# Patient Record
Sex: FEMALE | Race: WHITE | HISPANIC OR LATINO | Employment: UNEMPLOYED | URBAN - METROPOLITAN AREA
[De-identification: names, ages, dates, MRNs, and addresses within clinical notes are randomized per-mention and may not be internally consistent; named-entity substitution may affect disease eponyms.]

---

## 2021-11-08 ENCOUNTER — HOSPITAL ENCOUNTER (INPATIENT)
Facility: MEDICAL CENTER | Age: 51
LOS: 2 days | DRG: 812 | End: 2021-11-10
Attending: EMERGENCY MEDICINE | Admitting: STUDENT IN AN ORGANIZED HEALTH CARE EDUCATION/TRAINING PROGRAM
Payer: MEDICAID

## 2021-11-08 ENCOUNTER — APPOINTMENT (OUTPATIENT)
Dept: RADIOLOGY | Facility: MEDICAL CENTER | Age: 51
DRG: 812 | End: 2021-11-08
Attending: EMERGENCY MEDICINE
Payer: MEDICAID

## 2021-11-08 DIAGNOSIS — D64.9 SYMPTOMATIC ANEMIA: ICD-10-CM

## 2021-11-08 DIAGNOSIS — K92.2 UGI BLEED: ICD-10-CM

## 2021-11-08 DIAGNOSIS — D64.9 ANEMIA, UNSPECIFIED TYPE: ICD-10-CM

## 2021-11-08 DIAGNOSIS — R63.4 WEIGHT LOSS: ICD-10-CM

## 2021-11-08 DIAGNOSIS — E11.9 TYPE 2 DIABETES MELLITUS WITHOUT COMPLICATION, WITHOUT LONG-TERM CURRENT USE OF INSULIN (HCC): ICD-10-CM

## 2021-11-08 DIAGNOSIS — R10.31 RIGHT LOWER QUADRANT ABDOMINAL PAIN: ICD-10-CM

## 2021-11-08 PROBLEM — R73.9 HYPERGLYCEMIA: Status: ACTIVE | Noted: 2021-11-08

## 2021-11-08 LAB
ABO + RH BLD: NORMAL
ABO GROUP BLD: NORMAL
ALBUMIN SERPL BCP-MCNC: 4 G/DL (ref 3.2–4.9)
ALBUMIN/GLOB SERPL: 1.3 G/DL
ALP SERPL-CCNC: 82 U/L (ref 30–99)
ALT SERPL-CCNC: 17 U/L (ref 2–50)
ANION GAP SERPL CALC-SCNC: 10 MMOL/L (ref 7–16)
ANISOCYTOSIS BLD QL SMEAR: ABNORMAL
APPEARANCE UR: CLEAR
AST SERPL-CCNC: 14 U/L (ref 12–45)
BACTERIA #/AREA URNS HPF: ABNORMAL /HPF
BARCODED ABORH UBTYP: 600
BARCODED ABORH UBTYP: 6200
BARCODED PRD CODE UBPRD: NORMAL
BARCODED PRD CODE UBPRD: NORMAL
BARCODED UNIT NUM UBUNT: NORMAL
BARCODED UNIT NUM UBUNT: NORMAL
BASOPHILS # BLD AUTO: 0.6 % (ref 0–1.8)
BASOPHILS # BLD: 0.08 K/UL (ref 0–0.12)
BILIRUB SERPL-MCNC: 0.3 MG/DL (ref 0.1–1.5)
BILIRUB UR QL STRIP.AUTO: NEGATIVE
BLD GP AB SCN SERPL QL: NORMAL
BUN SERPL-MCNC: 18 MG/DL (ref 8–22)
CALCIUM SERPL-MCNC: 8.9 MG/DL (ref 8.5–10.5)
CHLORIDE SERPL-SCNC: 101 MMOL/L (ref 96–112)
CO2 SERPL-SCNC: 25 MMOL/L (ref 20–33)
COLOR UR: YELLOW
COMMENT 1642: NORMAL
COMPONENT R 8504R: NORMAL
COMPONENT R 8504R: NORMAL
CREAT SERPL-MCNC: 0.6 MG/DL (ref 0.5–1.4)
EOSINOPHIL # BLD AUTO: 0.05 K/UL (ref 0–0.51)
EOSINOPHIL NFR BLD: 0.4 % (ref 0–6.9)
EPI CELLS #/AREA URNS HPF: ABNORMAL /HPF
ERYTHROCYTE [DISTWIDTH] IN BLOOD BY AUTOMATED COUNT: 45.4 FL (ref 35.9–50)
GLOBULIN SER CALC-MCNC: 3.2 G/DL (ref 1.9–3.5)
GLUCOSE SERPL-MCNC: 350 MG/DL (ref 65–99)
GLUCOSE UR STRIP.AUTO-MCNC: >=1000 MG/DL
HCG SERPL QL: NEGATIVE
HCT VFR BLD AUTO: 23.5 % (ref 37–47)
HGB BLD-MCNC: 5.9 G/DL (ref 12–16)
HYPOCHROMIA BLD QL SMEAR: ABNORMAL
IMM GRANULOCYTES # BLD AUTO: 0.04 K/UL (ref 0–0.11)
IMM GRANULOCYTES NFR BLD AUTO: 0.3 % (ref 0–0.9)
INR PPP: 1.03 (ref 0.87–1.13)
KETONES UR STRIP.AUTO-MCNC: NEGATIVE MG/DL
LEUKOCYTE ESTERASE UR QL STRIP.AUTO: ABNORMAL
LIPASE SERPL-CCNC: 37 U/L (ref 11–82)
LYMPHOCYTES # BLD AUTO: 1.34 K/UL (ref 1–4.8)
LYMPHOCYTES NFR BLD: 10.2 % (ref 22–41)
MCH RBC QN AUTO: 15.2 PG (ref 27–33)
MCHC RBC AUTO-ENTMCNC: 25.1 G/DL (ref 33.6–35)
MCV RBC AUTO: 60.4 FL (ref 81.4–97.8)
MICRO URNS: ABNORMAL
MICROCYTES BLD QL SMEAR: ABNORMAL
MONOCYTES # BLD AUTO: 0.62 K/UL (ref 0–0.85)
MONOCYTES NFR BLD AUTO: 4.7 % (ref 0–13.4)
MORPHOLOGY BLD-IMP: NORMAL
NEUTROPHILS # BLD AUTO: 11.06 K/UL (ref 2–7.15)
NEUTROPHILS NFR BLD: 83.8 % (ref 44–72)
NITRITE UR QL STRIP.AUTO: NEGATIVE
NRBC # BLD AUTO: 0 K/UL
NRBC BLD-RTO: 0 /100 WBC
PH UR STRIP.AUTO: 6.5 [PH] (ref 5–8)
PLATELET # BLD AUTO: 474 K/UL (ref 164–446)
PLATELET BLD QL SMEAR: NORMAL
PMV BLD AUTO: 10.5 FL (ref 9–12.9)
POIKILOCYTOSIS BLD QL SMEAR: NORMAL
POTASSIUM SERPL-SCNC: 4.4 MMOL/L (ref 3.6–5.5)
PRODUCT TYPE UPROD: NORMAL
PRODUCT TYPE UPROD: NORMAL
PROT SERPL-MCNC: 7.2 G/DL (ref 6–8.2)
PROT UR QL STRIP: NEGATIVE MG/DL
PROTHROMBIN TIME: 13.2 SEC (ref 12–14.6)
RBC # BLD AUTO: 3.89 M/UL (ref 4.2–5.4)
RBC # URNS HPF: ABNORMAL /HPF
RBC BLD AUTO: PRESENT
RBC UR QL AUTO: ABNORMAL
RH BLD: NORMAL
SODIUM SERPL-SCNC: 136 MMOL/L (ref 135–145)
SP GR UR STRIP.AUTO: 1.02
STOMATOCYTES BLD QL SMEAR: NORMAL
UNIT STATUS USTAT: NORMAL
UNIT STATUS USTAT: NORMAL
UROBILINOGEN UR STRIP.AUTO-MCNC: 1 MG/DL
WBC # BLD AUTO: 13.2 K/UL (ref 4.8–10.8)
WBC #/AREA URNS HPF: ABNORMAL /HPF

## 2021-11-08 PROCEDURE — 770006 HCHG ROOM/CARE - MED/SURG/GYN SEMI*

## 2021-11-08 PROCEDURE — 83550 IRON BINDING TEST: CPT

## 2021-11-08 PROCEDURE — A9270 NON-COVERED ITEM OR SERVICE: HCPCS | Performed by: EMERGENCY MEDICINE

## 2021-11-08 PROCEDURE — 96375 TX/PRO/DX INJ NEW DRUG ADDON: CPT

## 2021-11-08 PROCEDURE — 84703 CHORIONIC GONADOTROPIN ASSAY: CPT

## 2021-11-08 PROCEDURE — 80053 COMPREHEN METABOLIC PANEL: CPT

## 2021-11-08 PROCEDURE — 36430 TRANSFUSION BLD/BLD COMPNT: CPT

## 2021-11-08 PROCEDURE — 86923 COMPATIBILITY TEST ELECTRIC: CPT

## 2021-11-08 PROCEDURE — 700117 HCHG RX CONTRAST REV CODE 255: Performed by: EMERGENCY MEDICINE

## 2021-11-08 PROCEDURE — 99285 EMERGENCY DEPT VISIT HI MDM: CPT

## 2021-11-08 PROCEDURE — 700102 HCHG RX REV CODE 250 W/ 637 OVERRIDE(OP): Performed by: EMERGENCY MEDICINE

## 2021-11-08 PROCEDURE — 700111 HCHG RX REV CODE 636 W/ 250 OVERRIDE (IP): Performed by: EMERGENCY MEDICINE

## 2021-11-08 PROCEDURE — 83690 ASSAY OF LIPASE: CPT

## 2021-11-08 PROCEDURE — 85610 PROTHROMBIN TIME: CPT

## 2021-11-08 PROCEDURE — 86850 RBC ANTIBODY SCREEN: CPT

## 2021-11-08 PROCEDURE — P9016 RBC LEUKOCYTES REDUCED: HCPCS

## 2021-11-08 PROCEDURE — 83540 ASSAY OF IRON: CPT

## 2021-11-08 PROCEDURE — 86900 BLOOD TYPING SEROLOGIC ABO: CPT

## 2021-11-08 PROCEDURE — 85025 COMPLETE CBC W/AUTO DIFF WBC: CPT

## 2021-11-08 PROCEDURE — 81001 URINALYSIS AUTO W/SCOPE: CPT

## 2021-11-08 PROCEDURE — 82728 ASSAY OF FERRITIN: CPT

## 2021-11-08 PROCEDURE — 86901 BLOOD TYPING SEROLOGIC RH(D): CPT

## 2021-11-08 PROCEDURE — 30233N1 TRANSFUSION OF NONAUTOLOGOUS RED BLOOD CELLS INTO PERIPHERAL VEIN, PERCUTANEOUS APPROACH: ICD-10-PCS | Performed by: EMERGENCY MEDICINE

## 2021-11-08 PROCEDURE — 96374 THER/PROPH/DIAG INJ IV PUSH: CPT

## 2021-11-08 PROCEDURE — 74177 CT ABD & PELVIS W/CONTRAST: CPT

## 2021-11-08 RX ORDER — ONDANSETRON 2 MG/ML
4 INJECTION INTRAMUSCULAR; INTRAVENOUS ONCE
Status: COMPLETED | OUTPATIENT
Start: 2021-11-08 | End: 2021-11-08

## 2021-11-08 RX ORDER — IBUPROFEN 200 MG
950 CAPSULE ORAL DAILY
COMMUNITY

## 2021-11-08 RX ADMIN — ONDANSETRON 4 MG: 2 INJECTION INTRAMUSCULAR; INTRAVENOUS at 22:35

## 2021-11-08 RX ADMIN — IOHEXOL 80 ML: 350 INJECTION, SOLUTION INTRAVENOUS at 23:15

## 2021-11-08 RX ADMIN — FAMOTIDINE 20 MG: 10 INJECTION INTRAVENOUS at 22:36

## 2021-11-08 RX ADMIN — LIDOCAINE HYDROCHLORIDE 30 ML: 20 SOLUTION OROPHARYNGEAL at 22:36

## 2021-11-09 PROBLEM — E11.9 TYPE 2 DIABETES MELLITUS, WITHOUT LONG-TERM CURRENT USE OF INSULIN (HCC): Status: ACTIVE | Noted: 2021-11-09

## 2021-11-09 LAB
BASOPHILS # BLD AUTO: 0.7 % (ref 0–1.8)
BASOPHILS # BLD: 0.07 K/UL (ref 0–0.12)
EOSINOPHIL # BLD AUTO: 0.21 K/UL (ref 0–0.51)
EOSINOPHIL NFR BLD: 2 % (ref 0–6.9)
ERYTHROCYTE [DISTWIDTH] IN BLOOD BY AUTOMATED COUNT: 51.1 FL (ref 35.9–50)
EST. AVERAGE GLUCOSE BLD GHB EST-MCNC: 154 MG/DL
FERRITIN SERPL-MCNC: 2.1 NG/ML (ref 10–291)
GLUCOSE BLD-MCNC: 145 MG/DL (ref 65–99)
GLUCOSE BLD-MCNC: 208 MG/DL (ref 65–99)
GLUCOSE BLD-MCNC: 81 MG/DL (ref 65–99)
GLUCOSE BLD-MCNC: 96 MG/DL (ref 65–99)
HBA1C MFR BLD: 7 % (ref 4–5.6)
HCT VFR BLD AUTO: 25.8 % (ref 37–47)
HGB BLD-MCNC: 6.8 G/DL (ref 12–16)
HGB BLD-MCNC: 8.4 G/DL (ref 12–16)
HGB BLD-MCNC: 8.4 G/DL (ref 12–16)
IMM GRANULOCYTES # BLD AUTO: 0.05 K/UL (ref 0–0.11)
IMM GRANULOCYTES NFR BLD AUTO: 0.5 % (ref 0–0.9)
IRON SATN MFR SERPL: 2 % (ref 15–55)
IRON SERPL-MCNC: 11 UG/DL (ref 40–170)
LYMPHOCYTES # BLD AUTO: 2.57 K/UL (ref 1–4.8)
LYMPHOCYTES NFR BLD: 23.9 % (ref 22–41)
MCH RBC QN AUTO: 16.7 PG (ref 27–33)
MCHC RBC AUTO-ENTMCNC: 26.4 G/DL (ref 33.6–35)
MCV RBC AUTO: 63.5 FL (ref 81.4–97.8)
MONOCYTES # BLD AUTO: 0.87 K/UL (ref 0–0.85)
MONOCYTES NFR BLD AUTO: 8.1 % (ref 0–13.4)
MORPHOLOGY BLD-IMP: NORMAL
NEUTROPHILS # BLD AUTO: 6.99 K/UL (ref 2–7.15)
NEUTROPHILS NFR BLD: 64.8 % (ref 44–72)
NRBC # BLD AUTO: 0 K/UL
NRBC BLD-RTO: 0 /100 WBC
PLATELET # BLD AUTO: 372 K/UL (ref 164–446)
PMV BLD AUTO: 10.8 FL (ref 9–12.9)
RBC # BLD AUTO: 4.06 M/UL (ref 4.2–5.4)
TIBC SERPL-MCNC: 455 UG/DL (ref 250–450)
UIBC SERPL-MCNC: 444 UG/DL (ref 110–370)
WBC # BLD AUTO: 10.8 K/UL (ref 4.8–10.8)

## 2021-11-09 PROCEDURE — 99223 1ST HOSP IP/OBS HIGH 75: CPT | Performed by: STUDENT IN AN ORGANIZED HEALTH CARE EDUCATION/TRAINING PROGRAM

## 2021-11-09 PROCEDURE — 700102 HCHG RX REV CODE 250 W/ 637 OVERRIDE(OP): Performed by: STUDENT IN AN ORGANIZED HEALTH CARE EDUCATION/TRAINING PROGRAM

## 2021-11-09 PROCEDURE — P9016 RBC LEUKOCYTES REDUCED: HCPCS

## 2021-11-09 PROCEDURE — 700105 HCHG RX REV CODE 258: Performed by: NURSE PRACTITIONER

## 2021-11-09 PROCEDURE — 86923 COMPATIBILITY TEST ELECTRIC: CPT

## 2021-11-09 PROCEDURE — 700102 HCHG RX REV CODE 250 W/ 637 OVERRIDE(OP): Performed by: NURSE PRACTITIONER

## 2021-11-09 PROCEDURE — 96372 THER/PROPH/DIAG INJ SC/IM: CPT

## 2021-11-09 PROCEDURE — A9270 NON-COVERED ITEM OR SERVICE: HCPCS | Performed by: STUDENT IN AN ORGANIZED HEALTH CARE EDUCATION/TRAINING PROGRAM

## 2021-11-09 PROCEDURE — 36430 TRANSFUSION BLD/BLD COMPNT: CPT

## 2021-11-09 PROCEDURE — A9270 NON-COVERED ITEM OR SERVICE: HCPCS | Performed by: NURSE PRACTITIONER

## 2021-11-09 PROCEDURE — 85018 HEMOGLOBIN: CPT | Mod: 91

## 2021-11-09 PROCEDURE — 83036 HEMOGLOBIN GLYCOSYLATED A1C: CPT

## 2021-11-09 PROCEDURE — 85025 COMPLETE CBC W/AUTO DIFF WBC: CPT

## 2021-11-09 PROCEDURE — 700111 HCHG RX REV CODE 636 W/ 250 OVERRIDE (IP): Performed by: NURSE PRACTITIONER

## 2021-11-09 PROCEDURE — 700105 HCHG RX REV CODE 258: Performed by: STUDENT IN AN ORGANIZED HEALTH CARE EDUCATION/TRAINING PROGRAM

## 2021-11-09 PROCEDURE — 82962 GLUCOSE BLOOD TEST: CPT | Mod: 91

## 2021-11-09 PROCEDURE — 770006 HCHG ROOM/CARE - MED/SURG/GYN SEMI*

## 2021-11-09 RX ORDER — OMEPRAZOLE 20 MG/1
20 CAPSULE, DELAYED RELEASE ORAL 2 TIMES DAILY
Status: DISCONTINUED | OUTPATIENT
Start: 2021-11-09 | End: 2021-11-10 | Stop reason: HOSPADM

## 2021-11-09 RX ORDER — DEXTROSE MONOHYDRATE 25 G/50ML
50 INJECTION, SOLUTION INTRAVENOUS
Status: DISCONTINUED | OUTPATIENT
Start: 2021-11-09 | End: 2021-11-10 | Stop reason: HOSPADM

## 2021-11-09 RX ORDER — SODIUM CHLORIDE, SODIUM LACTATE, POTASSIUM CHLORIDE, CALCIUM CHLORIDE 600; 310; 30; 20 MG/100ML; MG/100ML; MG/100ML; MG/100ML
INJECTION, SOLUTION INTRAVENOUS CONTINUOUS
Status: DISCONTINUED | OUTPATIENT
Start: 2021-11-09 | End: 2021-11-09

## 2021-11-09 RX ORDER — ACETAMINOPHEN 325 MG/1
650 TABLET ORAL EVERY 6 HOURS PRN
Status: DISCONTINUED | OUTPATIENT
Start: 2021-11-09 | End: 2021-11-10 | Stop reason: HOSPADM

## 2021-11-09 RX ADMIN — SODIUM CHLORIDE, POTASSIUM CHLORIDE, SODIUM LACTATE AND CALCIUM CHLORIDE: 600; 310; 30; 20 INJECTION, SOLUTION INTRAVENOUS at 03:03

## 2021-11-09 RX ADMIN — INSULIN GLARGINE 15 UNITS: 100 INJECTION, SOLUTION SUBCUTANEOUS at 01:55

## 2021-11-09 RX ADMIN — OMEPRAZOLE 20 MG: 20 CAPSULE, DELAYED RELEASE ORAL at 18:02

## 2021-11-09 RX ADMIN — ACETAMINOPHEN 650 MG: 325 TABLET ORAL at 18:02

## 2021-11-09 RX ADMIN — SODIUM CHLORIDE 125 MG: 9 INJECTION, SOLUTION INTRAVENOUS at 18:03

## 2021-11-09 RX ADMIN — INSULIN HUMAN 6 UNITS: 100 INJECTION, SOLUTION PARENTERAL at 01:54

## 2021-11-09 ASSESSMENT — COGNITIVE AND FUNCTIONAL STATUS - GENERAL
SUGGESTED CMS G CODE MODIFIER DAILY ACTIVITY: CH
MOVING TO AND FROM BED TO CHAIR: A LITTLE
SUGGESTED CMS G CODE MODIFIER MOBILITY: CI
MOBILITY SCORE: 23
DAILY ACTIVITIY SCORE: 24

## 2021-11-09 ASSESSMENT — PATIENT HEALTH QUESTIONNAIRE - PHQ9
3. TROUBLE FALLING OR STAYING ASLEEP OR SLEEPING TOO MUCH: SEVERAL DAYS
8. MOVING OR SPEAKING SO SLOWLY THAT OTHER PEOPLE COULD HAVE NOTICED. OR THE OPPOSITE, BEING SO FIGETY OR RESTLESS THAT YOU HAVE BEEN MOVING AROUND A LOT MORE THAN USUAL: SEVERAL DAYS
SUM OF ALL RESPONSES TO PHQ9 QUESTIONS 1 AND 2: 3
7. TROUBLE CONCENTRATING ON THINGS, SUCH AS READING THE NEWSPAPER OR WATCHING TELEVISION: NOT AT ALL
1. LITTLE INTEREST OR PLEASURE IN DOING THINGS: NOT AT ALL
5. POOR APPETITE OR OVEREATING: MORE THAN HALF THE DAYS
6. FEELING BAD ABOUT YOURSELF - OR THAT YOU ARE A FAILURE OR HAVE LET YOURSELF OR YOUR FAMILY DOWN: SEVERAL DAYS
9. THOUGHTS THAT YOU WOULD BE BETTER OFF DEAD, OR OF HURTING YOURSELF: NOT AT ALL
SUM OF ALL RESPONSES TO PHQ QUESTIONS 1-9: 9
2. FEELING DOWN, DEPRESSED, IRRITABLE, OR HOPELESS: NEARLY EVERY DAY
4. FEELING TIRED OR HAVING LITTLE ENERGY: SEVERAL DAYS

## 2021-11-09 ASSESSMENT — ENCOUNTER SYMPTOMS
WEIGHT LOSS: 1
MUSCULOSKELETAL NEGATIVE: 1
EYES NEGATIVE: 1
NEUROLOGICAL NEGATIVE: 1
CARDIOVASCULAR NEGATIVE: 1
RESPIRATORY NEGATIVE: 1
GASTROINTESTINAL NEGATIVE: 1
PSYCHIATRIC NEGATIVE: 1

## 2021-11-09 ASSESSMENT — LIFESTYLE VARIABLES
CONSUMPTION TOTAL: NEGATIVE
EVER FELT BAD OR GUILTY ABOUT YOUR DRINKING: NO
HAVE PEOPLE ANNOYED YOU BY CRITICIZING YOUR DRINKING: NO
HOW MANY TIMES IN THE PAST YEAR HAVE YOU HAD 5 OR MORE DRINKS IN A DAY: 0
AVERAGE NUMBER OF DAYS PER WEEK YOU HAVE A DRINK CONTAINING ALCOHOL: 0
TOTAL SCORE: 0
ON A TYPICAL DAY WHEN YOU DRINK ALCOHOL HOW MANY DRINKS DO YOU HAVE: 0
ALCOHOL_USE: NO
EVER HAD A DRINK FIRST THING IN THE MORNING TO STEADY YOUR NERVES TO GET RID OF A HANGOVER: NO
TOTAL SCORE: 0
TOTAL SCORE: 0
HAVE YOU EVER FELT YOU SHOULD CUT DOWN ON YOUR DRINKING: NO

## 2021-11-09 ASSESSMENT — PAIN DESCRIPTION - PAIN TYPE
TYPE: ACUTE PAIN

## 2021-11-09 NOTE — CARE PLAN
The patient is Stable - Low risk of patient condition declining or worsening    Shift Goals  Clinical Goals: communicate effectively  Patient Goals: rest    Progress made toward(s) clinical / shift goals:  Communication was conducted via  and through translation of pt daughter when at bedside. Using interpreters allows the pt to communicate needs appropriately. Patient has been resting comfortably since arriving to the unit.      Problem: Knowledge Deficit - Standard  Goal: Patient and family/care givers will demonstrate understanding of plan of care, disease process/condition, diagnostic tests and medications  Outcome: Progressing     Problem: Pain - Standard  Goal: Alleviation of pain or a reduction in pain to the patient’s comfort goal  Outcome: Progressing       Patient is not progressing towards the following goals:

## 2021-11-09 NOTE — H&P
Hospital Medicine History & Physical Note    Date of Service  11/9/2021    Primary Care Physician  No primary care provider on file.    Consultants  None    Code Status  Full Code    Chief Complaint  Chief Complaint   Patient presents with   • Abdominal Pain     x 4 days. Progressively getting worse.        History of Presenting Illness  Betsy Durant is a 51 y.o. female who presented 11/8/2021 with abdominal pain on the right lower quadrant.  Patient states that she has always had this abdominal pain, however for the last 4 days, she reports it has gotten worse.  Describes it as burning sensation, 5 out of 10.  States that it is worse upon sitting.  She vomited 3 days ago, a yellow-colored vomitus was seen, no blood.  Endorses mild chills, however no subjective fevers.  She states that she has lost about 40 pounds in the last year and a half unintentionally.  She denies drinking smoking illicit drug use.  She has never had endoscopy or colonoscopy before.  Denies history of anemia.  She has a sister who passed away to pancreatic cancer at 55 years old.    In the ED, patient found to have normal vital signs.  Remarkable labs include there is no leukocytosis, hemoglobin 5.9, MCV 60.4, suga  350.  CT abdomen showing no acute intra-abdominal abnormality, left ovarian cyst noted, fat-containing umbilical hernia is seen.  Patient's guaiac showed brown stool, however occult positive.  Given 1 unit of blood by ER before being endorsed to medicine    I discussed the plan of care with patient.    Review of Systems  Review of Systems   Constitutional: Positive for malaise/fatigue and weight loss.   HENT: Negative.    Eyes: Negative.    Respiratory: Negative.    Cardiovascular: Negative.    Gastrointestinal: Negative.    Genitourinary: Negative.    Musculoskeletal: Negative.    Skin: Negative.    Neurological: Negative.    Endo/Heme/Allergies: Negative.    Psychiatric/Behavioral: Negative.        Past Medical History  No  family medical history    Surgical History  No pertinent surgical history    Family History   Family history reviewed with patient. There is no family history that is pertinent to the chief complaint.     Social History   reports that she has never smoked. She has never used smokeless tobacco. She reports that she does not drink alcohol and does not use drugs.    Allergies  No Known Allergies    Medications  Prior to Admission Medications   Prescriptions Last Dose Informant Patient Reported? Taking?   calcium citrate (CALCITRATE) 950 (200 Ca) MG Tab 11/8/2021 at 0800  Yes Yes   Sig: Take 950 mg by mouth every day.      Facility-Administered Medications: None       Physical Exam  Temp:  [36.1 °C (96.9 °F)-36.2 °C (97.2 °F)] 36.2 °C (97.2 °F)  Pulse:  [68-75] 75  Resp:  [12-18] 12  BP: (112-122)/(55-92) 112/55  SpO2:  [96 %-100 %] 97 %  Blood Pressure: 112/55   Temperature: 36.2 °C (97.2 °F)   Pulse: 75   Respiration: 12   Pulse Oximetry: 97 %       Physical Exam  Constitutional:       Appearance: Normal appearance. She is normal weight.   HENT:      Head: Normocephalic.      Mouth/Throat:      Mouth: Mucous membranes are moist.   Eyes:      Pupils: Pupils are equal, round, and reactive to light.   Cardiovascular:      Rate and Rhythm: Normal rate and regular rhythm.      Pulses: Normal pulses.   Pulmonary:      Effort: Pulmonary effort is normal.      Breath sounds: Normal breath sounds.   Abdominal:      General: Abdomen is flat. Bowel sounds are normal.      Palpations: Abdomen is soft.      Comments: No rigidity no rebound tenderness seen   Musculoskeletal:         General: Normal range of motion.      Cervical back: Normal range of motion.   Skin:     General: Skin is warm.   Neurological:      General: No focal deficit present.      Mental Status: She is alert and oriented to person, place, and time. Mental status is at baseline.   Psychiatric:         Mood and Affect: Mood normal.         Behavior: Behavior  normal.         Thought Content: Thought content normal.         Judgment: Judgment normal.         Laboratory:  Recent Labs     11/08/21 2124   WBC 13.2*   RBC 3.89*   HEMOGLOBIN 5.9*   HEMATOCRIT 23.5*   MCV 60.4*   MCH 15.2*   MCHC 25.1*   RDW 45.4   PLATELETCT 474*   MPV 10.5     Recent Labs     11/08/21 2124   SODIUM 136   POTASSIUM 4.4   CHLORIDE 101   CO2 25   GLUCOSE 350*   BUN 18   CREATININE 0.60   CALCIUM 8.9     Recent Labs     11/08/21 2124   ALTSGPT 17   ASTSGOT 14   ALKPHOSPHAT 82   TBILIRUBIN 0.3   LIPASE 37   GLUCOSE 350*     Recent Labs     11/08/21  2230   INR 1.03     No results for input(s): NTPROBNP in the last 72 hours.      No results for input(s): TROPONINT in the last 72 hours.    Imaging:  CT-ABDOMEN-PELVIS WITH   Final Result         1.  No acute intra-abdominal abnormality.   2.  Left ovarian cyst, follow-up pelvic sonogram in 6 weeks for repeat characterization and evaluation of size stability.   3.  Fat-containing umbilical hernia   4.  Mild hepatomegaly          no X-Ray or EKG requiring interpretation    Assessment/Plan:  I anticipate this patient will require at least two midnights for appropriate medical management, necessitating inpatient admission.    * Symptomatic anemia  Assessment & Plan  Suspect occult GI bleed with no previous history of colonoscopy  Denies melena or hematemesis  Noted to have microcytic anemia, send iron panel  Given 1 unit of blood in the ED, serial CBC  Fluids, n.p.o.  GI consult in a.m.  Guaiac in ED occult positive, however no gross blood seen in stool sample        Hyperglycemia  Assessment & Plan  Lantus 15 U x 1 now  RISS  A1c        VTE prophylaxis: pharmacologic prophylaxis contraindicated due to Occult bleeding

## 2021-11-09 NOTE — ED NOTES
Med rec updated and complete. Allergies reviewed  Via interview with family ( son )  At bedside.   Pt takes no prescription medications       Home pharmacy  [facility] [phone]

## 2021-11-09 NOTE — ASSESSMENT & PLAN NOTE
Suspect occult GI bleed with no previous history of colonoscopy  Denies melena or hematemesis  Noted to have microcytic anemia.    Iron low.  Replacement ordered.  Given 1 unit of blood in the ED  Hgb 5.9 --> 6.8  Additional unit of PRBC orderd.   Guaiac in ED occult positive, however no gross blood seen in stool sample  11/9:  Discussed with GI, Dr. Fernandez.  Recommends EGD/Colonoscopy however secondary to limited endoscopy availability, will trend hgb and start prophylactic PPI.  If hgb stabilizes, can likely undergo workup as outpatient.

## 2021-11-09 NOTE — PROGRESS NOTES
..4 Eyes Skin Assessment Completed by CARRIE Marroquin and CARRIE Boles.    Head WDL  Ears WDL  Nose WDL  Mouth WDL  Neck WDL  Breast/Chest WDL  Shoulder Blades WDL  Spine WDL  (R) Arm/Elbow/Hand WDL  (L) Arm/Elbow/Hand WDL  Abdomen WDL  Groin WDL  Scrotum/Coccyx/Buttocks WDL  (R) Leg varicose veins  (L) Leg varicose veins  (R) Heel/Foot/Toe WDL damaged toe nails  (L) Heel/Foot/Toe WDL damaged toe nails          Devices In Places Pulse Ox      Interventions In Place Pillows and Pressure Redistribution Mattress    Possible Skin Injury No    Pictures Uploaded Into Epic N/A  Wound Consult Placed N/A  RN Wound Prevention Protocol Ordered No

## 2021-11-09 NOTE — PROGRESS NOTES
This is a 51-year-old female with no known past medical history admitted with complaints of progressive abdominal pain over 4 days.  Patient also reported episodes of vomiting.  She denied melena or hematemesis.  Of note patient is also reported intermittent episodes of dizziness.  On admission patient found to have significant anemia with hemoglobin of 5.9.  She has undergone PRBC transfusions.  CT abdomen and pelvis was negative for acute intra-abdominal abnormality, only revealing left ovarian cyst and fat-containing umbilical hernia.  She was noted to have positive occult stool.    10/9: Hemoglobin improved to 6.8.  Additional PRBC transfusion ordered.  Patient also noted to have severe iron deficiency.  Iron replacement has been ordered.  Patient reports overall improvement of her symptoms.  She denies dizziness or lightheadedness.  She does continue to have central abdominal pain, although this is overall improved.  Discussed case with GI, Dr. Fernandez.  They patient will ultimately need EGD/colonoscopy.  The patient right now is clinically stable.  We will continue to trend hemoglobin and initiate prophylactic PPI.  If hemoglobin stabilizes patient can likely undergo endoscopy as outpatient.  If she has acute worsening of her hemoglobin will undergo urgent inpatient EGD.

## 2021-11-09 NOTE — DIETARY
Nutrition services: Day 1 of admit.  Betsy Durant is a 51 y.o. female with admitting DX of symptomatic anemia.    Consult received for wt loss (24-33 lbs >1 year), poor PO per admit screen, diabetes diet education. Spoke with pt and son at bedside. Pt appeared adequately nourished. Pt declined the use of the  line to conduct interview in Citizen of [place]. Son at bedside able to provide translation per pt request. Pt reports wt loss over the past year with a previous UBW of ~160-170 lbs. Potential wt loss of 10% in one year is not significant, but worth noting.  Pt has had multiple deaths in the family recently and reports to not feeling hungry. Provided T2DM diet education booklet in Citizen of [place]. Pt declined further explanation.    Assessment:  Weight: 64.9 kg (143 lb)  There is no height or weight on file to calculate BMI., BMI classification: unable to determine  Diet/Intake: NPO    Malnutrition Risk: Does not meet criteria per ASPEN guidelines at this time.    Recommendations/Plan:  1. Advance diet as tolerated per MD.  2. Monitor weight.  3. Nutrition rep will continue to see patient for ongoing meal and snack preferences.     RD will monitor per dept guidelines.

## 2021-11-09 NOTE — ED TRIAGE NOTES
Betsy Durant    Chief Complaint   Patient presents with   • Abdominal Pain     x 4 days. Progressively getting worse.        Vitals:    11/08/21 2018   BP: 120/58   Pulse: 68   Resp: 18   Temp: 36.1 °C (96.9 °F)   SpO2: 100%       PT has HX of hernia. PT states the pain just got too bad so she wanted to get scene.     PT ambulatory to triage with a steady gait. PT asked to wait in the lobby and educated on the triage process.

## 2021-11-09 NOTE — ED PROVIDER NOTES
ED Provider Note    CHIEF COMPLAINT  Chief Complaint   Patient presents with   • Abdominal Pain     x 4 days. Progressively getting worse.        HPI  Patient is a 49-year-old female with a primary language is [place], was seen emergently for abdominal pain and new anemia and family members are used at the bedside for emergent interpretation.  Patient states she has been having on and off pain in her upper and right side of the abdomen for the better part of 4 days.  She has had some occasional episodes of pain that were similar to this that have gone back several months but has a hard time recalling the severity or the longevity of his pain she has been stressed.  Family was report that she lost 3 or so family members within the last year and a half and has been busy taking care of that as opposed to herself.  During that time she has not been complaining of pain but more fatigue, occasional dizziness and complaints of a chronic indwelling hernia that has been occasionally painful though not to the same severity at this time.  She points to an area on the right side of the abdomen, and states that her hernia is in the mid lower section of the abdomen.  She denies any overlying redness, rigidity, changes in bowel movements but states that she does not look at her bowel movements and cannot confirm any blood or dark discoloration.  Since her pain started she has been nauseous with several episodes of nonbilious nonbloody emesis.  She feels chronically fatigued, she denies shortness of breath or chest pain    She denies any prior diagnosis of cancer, she does report generalized weight loss of 40 to 50 pounds over the last year and a half.    PPE Note: I personally donned full PPE for all patient encounters during this visit, including being clean-shaven with an N95 respirator mask, gloves, and goggles.     REVIEW OF SYSTEMS  See HPI for further details. All other systems are negative.       PAST MEDICAL HISTORY        SOCIAL HISTORY  Social History     Tobacco Use   • Smoking status: Never Smoker   • Smokeless tobacco: Never Used   Substance and Sexual Activity   • Alcohol use: Never   • Drug use: Never   • Sexual activity: Not on file       SURGICAL HISTORY  patient denies any surgical history    CURRENT MEDICATIONS  Home Medications     Reviewed by Nupur Torres (Pharmacy Tech) on 11/08/21 at 2239  Med List Status: Complete   Medication Last Dose Status   calcium citrate (CALCITRATE) 950 (200 Ca) MG Tab 11/8/2021 Active                ALLERGIES  No Known Allergies    PHYSICAL EXAM  VITAL SIGNS: /92   Pulse 70   Temp 36.2 °C (97.1 °F) (Temporal)   Resp 16   Wt 64.9 kg (143 lb)   SpO2 96%   Pulse ox interpretation: I interpret this pulse ox as normal.  Genl: fatigued appearing F, sitting in gurney comfortably, speaking [place] clearly, appears in very mild distress   Head: NC/AT   ENT: Mucous membranes moist, posterior pharynx clear, uvula midline, nares patent bilaterally  Eyes: pale sclera, pupils equal round reactive to light  Neck: Supple, FROM, no LAD appreciated  Pulmonary: Lungs are clear to auscultation bilaterally  Chest: No TTP  CV:  RRR, no murmur appreciated, pulses 2+ in both upper and lower extremities,  Abdomen: soft, nonspecific and inconsistent pain to mild palpation in the right upper quadrant, right flank and right midportion of the abdomen.  No periumbilical pain, there is a substantial periumbilical hernia without overlying skin changes, no grimacing with palpation, ND; no rebound/guarding, no HSM  : no CVA or suprapubic tenderness.  No tenderness along the perirectal tissues, there is no appreciable mass on digital rectal exam.  Guaiac test at bedside has very faintly positive result.  No rosalinda blood or melena.  Musculoskeletal: Pain free ROM of the neck. Moving upper and lower extremities and spontaneous in coordinated fashion  Neuro: A&Ox4 (person, place, time, situation), speech  fluent, gait steady, no focal deficits appreciated, No cerebellar signs. Sensation is grossly intact in the distal upper and lower extremities.  5/5 strength in  and dorsiflexion/plantar flexion of the ankles  Psych: Patient has an appropriate affect and behavior  Skin: No rash or lesions.  No pallor or jaundice.  No cyanosis.  Warm and dry.     DIAGNOSTIC STUDIES / PROCEDURES    LABS  Labs Reviewed   CBC WITH DIFFERENTIAL - Abnormal; Notable for the following components:       Result Value    WBC 13.2 (*)     RBC 3.89 (*)     Hemoglobin 5.9 (*)     Hematocrit 23.5 (*)     MCV 60.4 (*)     MCH 15.2 (*)     MCHC 25.1 (*)     Platelet Count 474 (*)     Neutrophils-Polys 83.80 (*)     Lymphocytes 10.20 (*)     Neutrophils (Absolute) 11.06 (*)     Hypochromia 2+ (*)     Anisocytosis 2+ (*)     Microcytosis 2+ (*)     All other components within normal limits   COMP METABOLIC PANEL - Abnormal; Notable for the following components:    Glucose 350 (*)     All other components within normal limits   URINALYSIS - Abnormal; Notable for the following components:    Glucose >=1000 (*)     Leukocyte Esterase Moderate (*)     Occult Blood Trace (*)     All other components within normal limits   URINE MICROSCOPIC (W/UA) - Abnormal; Notable for the following components:    WBC 10-20 (*)     Bacteria Moderate (*)     All other components within normal limits   LIPASE   ESTIMATED GFR   PERIPHERAL SMEAR REVIEW   PLATELET ESTIMATE   MORPHOLOGY   DIFFERENTIAL COMMENT   HCG QUAL SERUM    Narrative:     Indicate which anticoagulants the patient is on:->UNKNOWN   COD (ADULT)   PROTHROMBIN TIME    Narrative:     Indicate which anticoagulants the patient is on:->UNKNOWN   ABO RH CONFIRM   RELEASE RED BLOOD CELLS   TRANSFUSE RED BLOOD CELLS-NURSING COMMUNICATION (UNITS)       RADIOLOGY  CT-ABDOMEN-PELVIS WITH   Final Result         1.  No acute intra-abdominal abnormality.   2.  Left ovarian cyst, follow-up pelvic sonogram in 6 weeks  for repeat characterization and evaluation of size stability.   3.  Fat-containing umbilical hernia   4.  Mild hepatomegaly          COURSE & MEDICAL DECISION MAKING  Pertinent Labs & Imaging studies reviewed. (See chart for details)    DDX:  Diverticulitis, r/o gastric ulcer, peptic ulcer, duodenal ulcer, gastritis, abdominal malignancy, anemia, coagulopathy.    MDM    Initial evaluation at 2207:  Patient seen and evaluated for symptoms as described above.  Concern for patient's recent weight loss, fatigue and new complaints of abdominal pain could be any referred discomfort from any possible abdominal malignancy.  Overall the patient's distribution of discomfort and noted anemia on triage labs would be for any possible GI bleed, diverticular disease.  This is likely a very chronic condition as the patient is not tachycardic or hypotensive.  She does not have progression of peritonitis and there is some element of intermittent discomfort and pain.  Guaiac is very weakly positive with no rosalinda blood or melena on rectal exam, unlikely to be a substantial GI bleed or angiodysplasia.  Started on prophylaxis medication for possible upper GI bleed of chronic etiology.  Consented for blood as noted below, H&H is substantial enough for further replacement with blood products as necessary.  CTA of the abdomen shows no acute mass, there is no acute infectious etiologies.  There is a small unremarkable left ovarian cyst unlikely to be the cause of her substantial anemia.  Patient will be admitted on GI prophylaxis, warrants further resuscitation and nonemergent GI consult for likely colonoscopy.  Patient and family members are constantly updated, discussed the hospitalist the patient is made in guarded condition.    HYDRATION: Based on the patient's presentation of Dehydration, GI Bleed / Upset and Other npo status for possible surgical cause the patient was given IV fluids. IV Hydration was used because oral hydration was  not adequate alone. Upon recheck following hydration, the patient was improved.    BLOOD CONSENT  I have explained to the patient the risks and benefits of transfusion of blood products.  This includes, as appropriate, the risk of mild allergic reaction, hemolytic reaction, transfusion-associated lung injury, febrile reactions, circulatory or iron overload, and infection.    We discussed possible alternatives and their risks, including directed donation, autologous transfusion, and no transfusion, including IV or oral iron supplementation, as appropriate.  I believe the patient understands the risks and benefits and was able to express understanding.    CRITICAL CARE:  I saw and evaluated this patient. I personally provided 38 minutes of critical care time to the patient excluding billable procedures and directly and personally provided the following treatment and critical care management:  Critical Care Interventions  Multiple bedside assessments, coordination of care with family and other historical sources and Resuscitation using blood products    FINAL IMPRESSION  Visit Diagnoses     ICD-10-CM   1. Right lower quadrant abdominal pain  R10.31   2. Anemia, unspecified type  D64.9   3. Weight loss  R63.4   4. UGI bleed  K92.2   5. Symptomatic anemia  D64.9     Electronically signed by: Garrick Davila M.D., 11/8/2021 10:07 PM

## 2021-11-09 NOTE — ED NOTES
Stella from Lab called with critical result of Hgb of 5.9 at 2154. Critical lab result read back to Stella.

## 2021-11-09 NOTE — PROGRESS NOTES
Report received from ED RN. Pt transported in [facility] with ED RN.  Assessment complete.  A&O x 4. Patient calls appropriately.  Patient is up self. Bed alarm off.   Patient has 1/10 pain. Declines pain intervention at this time.  Denies N&V. Pt is NPO sips with meds.  + void, + flatus, - BM.  Patient denies SOB.  Patient is a Moh[place] speaker.  Review plan with of care with patient. Call light and personal belongings within reach. Hourly rounding in place. All needs met at this time.

## 2021-11-10 ENCOUNTER — PATIENT OUTREACH (OUTPATIENT)
Dept: HEALTH INFORMATION MANAGEMENT | Facility: OTHER | Age: 51
End: 2021-11-10

## 2021-11-10 ENCOUNTER — PHARMACY VISIT (OUTPATIENT)
Dept: PHARMACY | Facility: MEDICAL CENTER | Age: 51
End: 2021-11-10
Payer: COMMERCIAL

## 2021-11-10 VITALS
SYSTOLIC BLOOD PRESSURE: 116 MMHG | DIASTOLIC BLOOD PRESSURE: 64 MMHG | HEART RATE: 65 BPM | RESPIRATION RATE: 18 BRPM | OXYGEN SATURATION: 94 % | TEMPERATURE: 98.1 F | WEIGHT: 143 LBS

## 2021-11-10 LAB
ANION GAP SERPL CALC-SCNC: 6 MMOL/L (ref 7–16)
BASOPHILS # BLD AUTO: 1.1 % (ref 0–1.8)
BASOPHILS # BLD: 0.11 K/UL (ref 0–0.12)
BUN SERPL-MCNC: 10 MG/DL (ref 8–22)
CALCIUM SERPL-MCNC: 8.5 MG/DL (ref 8.5–10.5)
CHLORIDE SERPL-SCNC: 106 MMOL/L (ref 96–112)
CO2 SERPL-SCNC: 26 MMOL/L (ref 20–33)
CREAT SERPL-MCNC: 0.48 MG/DL (ref 0.5–1.4)
EOSINOPHIL # BLD AUTO: 0.48 K/UL (ref 0–0.51)
EOSINOPHIL NFR BLD: 5 % (ref 0–6.9)
ERYTHROCYTE [DISTWIDTH] IN BLOOD BY AUTOMATED COUNT: 59.8 FL (ref 35.9–50)
GLUCOSE BLD-MCNC: 115 MG/DL (ref 65–99)
GLUCOSE BLD-MCNC: 152 MG/DL (ref 65–99)
GLUCOSE BLD-MCNC: 239 MG/DL (ref 65–99)
GLUCOSE SERPL-MCNC: 115 MG/DL (ref 65–99)
HCT VFR BLD AUTO: 30.7 % (ref 37–47)
HGB BLD-MCNC: 8.7 G/DL (ref 12–16)
IMM GRANULOCYTES # BLD AUTO: 0.04 K/UL (ref 0–0.11)
IMM GRANULOCYTES NFR BLD AUTO: 0.4 % (ref 0–0.9)
LYMPHOCYTES # BLD AUTO: 2.28 K/UL (ref 1–4.8)
LYMPHOCYTES NFR BLD: 23.8 % (ref 22–41)
MCH RBC QN AUTO: 18.7 PG (ref 27–33)
MCHC RBC AUTO-ENTMCNC: 28.3 G/DL (ref 33.6–35)
MCV RBC AUTO: 66 FL (ref 81.4–97.8)
MONOCYTES # BLD AUTO: 0.82 K/UL (ref 0–0.85)
MONOCYTES NFR BLD AUTO: 8.5 % (ref 0–13.4)
NEUTROPHILS # BLD AUTO: 5.87 K/UL (ref 2–7.15)
NEUTROPHILS NFR BLD: 61.2 % (ref 44–72)
NRBC # BLD AUTO: 0 K/UL
NRBC BLD-RTO: 0 /100 WBC
PLATELET # BLD AUTO: 395 K/UL (ref 164–446)
PMV BLD AUTO: 10.4 FL (ref 9–12.9)
POTASSIUM SERPL-SCNC: 3.9 MMOL/L (ref 3.6–5.5)
RBC # BLD AUTO: 4.65 M/UL (ref 4.2–5.4)
SODIUM SERPL-SCNC: 138 MMOL/L (ref 135–145)
TSH SERPL DL<=0.005 MIU/L-ACNC: 1.45 UIU/ML (ref 0.38–5.33)
VIT B12 SERPL-MCNC: 586 PG/ML (ref 211–911)
WBC # BLD AUTO: 9.6 K/UL (ref 4.8–10.8)

## 2021-11-10 PROCEDURE — 82607 VITAMIN B-12: CPT

## 2021-11-10 PROCEDURE — 80048 BASIC METABOLIC PNL TOTAL CA: CPT

## 2021-11-10 PROCEDURE — 82962 GLUCOSE BLOOD TEST: CPT | Mod: 91

## 2021-11-10 PROCEDURE — 700102 HCHG RX REV CODE 250 W/ 637 OVERRIDE(OP): Performed by: NURSE PRACTITIONER

## 2021-11-10 PROCEDURE — 99239 HOSP IP/OBS DSCHRG MGMT >30: CPT | Performed by: NURSE PRACTITIONER

## 2021-11-10 PROCEDURE — RXMED WILLOW AMBULATORY MEDICATION CHARGE: Performed by: NURSE PRACTITIONER

## 2021-11-10 PROCEDURE — 84443 ASSAY THYROID STIM HORMONE: CPT

## 2021-11-10 PROCEDURE — 85025 COMPLETE CBC W/AUTO DIFF WBC: CPT

## 2021-11-10 PROCEDURE — A9270 NON-COVERED ITEM OR SERVICE: HCPCS | Performed by: NURSE PRACTITIONER

## 2021-11-10 RX ORDER — OMEPRAZOLE 20 MG/1
20 CAPSULE, DELAYED RELEASE ORAL 2 TIMES DAILY
Qty: 60 CAPSULE | Refills: 0 | Status: SHIPPED | OUTPATIENT
Start: 2021-11-10

## 2021-11-10 RX ORDER — FERROUS SULFATE 325(65) MG
325 TABLET ORAL DAILY
Qty: 90 TABLET | Refills: 0 | Status: SHIPPED | OUTPATIENT
Start: 2021-11-10 | End: 2021-11-10 | Stop reason: SDUPTHER

## 2021-11-10 RX ORDER — OMEPRAZOLE 20 MG/1
20 CAPSULE, DELAYED RELEASE ORAL 2 TIMES DAILY
Qty: 60 CAPSULE | Refills: 0 | Status: SHIPPED | OUTPATIENT
Start: 2021-11-10 | End: 2021-11-10

## 2021-11-10 RX ORDER — FERROUS SULFATE 325(65) MG
325 TABLET ORAL DAILY
Qty: 90 TABLET | Refills: 0 | Status: SHIPPED | OUTPATIENT
Start: 2021-11-10 | End: 2022-02-08

## 2021-11-10 RX ADMIN — METFORMIN HYDROCHLORIDE 500 MG: 500 TABLET ORAL at 08:51

## 2021-11-10 RX ADMIN — OMEPRAZOLE 20 MG: 20 CAPSULE, DELAYED RELEASE ORAL at 05:59

## 2021-11-10 RX ADMIN — INSULIN HUMAN 6 UNITS: 100 INJECTION, SOLUTION PARENTERAL at 12:16

## 2021-11-10 RX ADMIN — INSULIN HUMAN 3 UNITS: 100 INJECTION, SOLUTION PARENTERAL at 00:14

## 2021-11-10 ASSESSMENT — PAIN DESCRIPTION - PAIN TYPE: TYPE: ACUTE PAIN

## 2021-11-10 NOTE — DISCHARGE SUMMARY
Discharge Summary    CHIEF COMPLAINT ON ADMISSION  Chief Complaint   Patient presents with   • Abdominal Pain     x 4 days. Progressively getting worse.        Reason for Admission  Abdominal Pain     Admission Date  11/8/2021    CODE STATUS  Full Code    HPI & HOSPITAL COURSE  This is a 51-year-old female with no known past medical history admitted with complaints of progressive abdominal pain over 4 days.  Patient also reported episodes of vomiting.  She denied melena or hematemesis.  Of note patient is also reported intermittent episodes of dizziness.  On admission patient found to have significant anemia with hemoglobin of 5.9.  She was also noted to have significant iron deficiency anemia. She has undergone PRBC transfusions and iron infusions.  CT abdomen and pelvis was negative for acute intra-abdominal abnormality, only revealing left ovarian cyst and fat-containing umbilical hernia.  She was noted to have positive occult stool.  The case was discussed with GI, Dr. Fernandez, recommended initiating twice daily PPI and monitoring of her hemoglobin levels overnight.  Her hemoglobin did improve after receiving PRBC transfusion and remained stable overnight.  She had no complaints of hematochezia or hematemesis over her hospital stay.  Her abdominal pain completely resolved.  She is tolerating an oral diet.  At this time there is no urgent need for endoscopy, however the patient is recommended to undergo EGD/colonoscopy in the outpatient setting.  Dr. Fernandez will set outpatient appointment with gastroenterology.  She will continue on twice daily PPI and oral iron supplementation for now.    Of note patient also found to have new diagnosis of diabetes mellitus with hemoglobin A1c of 7.0.  She denies known medical history of this.  She has been initiated on Metformin 500 mg twice daily.  She received diet education at bedside and is recommended to have ongoing management of her diabetes through her outpatient  PCP.    At this time patient has no further need for acute intervention and is safe discharged home.    Therefore, she is discharged in good and stable condition to home with close outpatient follow-up.    The patient met 2-midnight criteria for an inpatient stay at the time of discharge.    Discharge Date  11/10/2021    FOLLOW UP ITEMS POST DISCHARGE  -Follow-up with GI in the outpatient setting.  -Continue PPI and iron supplementation.  -Continue consistent carbohydrate diet    DISCHARGE DIAGNOSES  Principal Problem:    Symptomatic anemia POA: Unknown  Active Problems:    Hyperglycemia POA: Unknown    Type 2 diabetes mellitus, without long-term current use of insulin (HCC) POA: Unknown  Resolved Problems:    * No resolved hospital problems. *      FOLLOW UP    [address] [address] [phone]    Please call [facility] to establish with a Primary Care Provider. This office offers sliding fee scales based on income. Thank you      MEDICATIONS ON DISCHARGE     Medication List      START taking these medications      Instructions   ferrous sulfate 325 (65 Fe) MG tablet   Take 1 Tablet by mouth every day for 90 days.  Dose: 325 mg     metFORMIN 500 MG Tabs  Commonly known as: GLUCOPHAGE   Take 1 Tablet by mouth 2 times a day with meals.  Dose: 500 mg     omeprazole 20 MG delayed-release capsule  Commonly known as: PRILOSEC   Take 1 Capsule by mouth 2 times a day.  Dose: 20 mg        CONTINUE taking these medications      Instructions   calcium citrate 950 (200 Ca) MG Tabs  Commonly known as: CALCITRATE   Take 950 mg by mouth every day.  Dose: 950 mg            Allergies  No Known Allergies    DIET  Orders Placed This Encounter   Procedures   • Diet Order Diet: Consistent CHO (Diabetic)     Standing Status:   Standing     Number of Occurrences:   1     Order Specific Question:   Diet:     Answer:   Consistent CHO (Diabetic) [4]       ACTIVITY  As  tolerated.  Weight bearing as tolerated    LABORATORY  Lab Results   Component Value Date    SODIUM 138 11/10/2021    POTASSIUM 3.9 11/10/2021    CHLORIDE 106 11/10/2021    CO2 26 11/10/2021    GLUCOSE 115 (H) 11/10/2021    BUN 10 11/10/2021    CREATININE 0.48 (L) 11/10/2021        Lab Results   Component Value Date    WBC 9.6 11/10/2021    HEMOGLOBIN 8.7 (L) 11/10/2021    HEMATOCRIT 30.7 (L) 11/10/2021    PLATELETCT 395 11/10/2021        Total time of the discharge process was 36 minutes.

## 2021-11-10 NOTE — PROGRESS NOTES
Bedside report received.  Assessment complete.  A&O x 4. Patient calls appropriately.  Patient is up self. Bed alarm off.   Patient has 0/10 pain.  Denies N&V. Tolerating regular diet.  + void, + flatus, - BM.  Patient denies SOB.  Patient with family at bedside.  Review plan with of care with patient. Call light and personal belongings within reach. Hourly rounding in place. All needs met at this time.

## 2021-11-10 NOTE — CARE PLAN
The patient is Stable - Low risk of patient condition declining or worsening    Shift Goals  Clinical Goals: monitor hbg levels  Patient Goals: rest    Progress made toward(s) clinical / shift goals:  RN has been monitoring CBC results. Hemoglobin has increased into normal range and has stayed consistent for 2 lab draws. Patient has been resting the whole shift.      Problem: Knowledge Deficit - Standard  Goal: Patient and family/care givers will demonstrate understanding of plan of care, disease process/condition, diagnostic tests and medications  Outcome: Progressing     Problem: Pain - Standard  Goal: Alleviation of pain or a reduction in pain to the patient’s comfort goal  Outcome: Progressing  Pt does not complain of pain       Patient is not progressing towards the following goals:

## 2021-11-10 NOTE — DISCHARGE INSTRUCTIONS
Discharge Instructions    Discharged to home by car with relative. Discharged via wheelchair, hospital escort: Yes.  Special equipment needed: Not Applicable    Be sure to schedule a follow-up appointment with your primary care doctor or any specialists as instructed.     Discharge Plan:   Influenza Vaccine Indication: Indicated: 9 to 64 years of age    I understand that a diet low in cholesterol, fat, and sodium is recommended for good health. Unless I have been given specific instructions below for another diet, I accept this instruction as my diet prescription.   Other diet: advance as tolerated    Special Instructions: None    · Is patient discharged on Warfarin / Coumadin?   No     Depression / Suicide Risk    As you are discharged from this [facility] facility, it is important to learn how to keep safe from harming yourself.    Recognize the warning signs:  · Abrupt changes in personality, positive or negative- including increase in energy   · Giving away possessions  · Change in eating patterns- significant weight changes-  positive or negative  · Change in sleeping patterns- unable to sleep or sleeping all the time   · Unwillingness or inability to communicate  · Depression  · Unusual sadness, discouragement and loneliness  · Talk of wanting to die  · Neglect of personal appearance   · Rebelliousness- reckless behavior  · Withdrawal from people/activities they love  · Confusion- inability to concentrate     If you or a loved one observes any of these behaviors or has concerns about self-harm, here's what you can do:  · Talk about it- your feelings and reasons for harming yourself  · Remove any means that you might use to hurt yourself (examples: pills, rope, extension cords, firearm)  · Get professional help from the community (Mental Health, Substance Abuse, psychological counseling)  · Do not be alone:Call your Safe Contact- someone whom you trust who will be there for you.  · Call your local CRISIS  HOTLINE 437-1726 or 316-374-4824  · Call your local Children's Mobile Crisis Response Team [place] (863) 485-2927 or www.[place] T[employer]  · Call the toll free National Suicide Prevention Hotlines   · National Suicide Prevention Lifeline 483-591-EQRR (9799)  · [employer] 800-SUICIDE (171-9032)        Anemia    Anemia is a condition in which you do not have enough red blood cells or hemoglobin. Hemoglobin is a substance in red blood cells that carries oxygen. When you do not have enough red blood cells or hemoglobin (are anemic), your body cannot get enough oxygen and your organs may not work properly. As a result, you may feel very tired or have other problems.  What are the causes?  Common causes of anemia include:  · Excessive bleeding. Anemia can be caused by excessive bleeding inside or outside the body, including bleeding from the intestine or from periods in women.  · Poor nutrition.  · Long-lasting (chronic) kidney, thyroid, and liver disease.  · Bone marrow disorders.  · Cancer and treatments for cancer.  · HIV (human immunodeficiency virus) and AIDS (acquired immunodeficiency syndrome).  · Treatments for HIV and AIDS.  · Spleen problems.  · Blood disorders.  · Infections, medicines, and autoimmune disorders that destroy red blood cells.  What are the signs or symptoms?  Symptoms of this condition include:  · Minor weakness.  · Dizziness.  · Headache.  · Feeling heartbeats that are irregular or faster than normal (palpitations).  · Shortness of breath, especially with exercise.  · Paleness.  · Cold sensitivity.  · Indigestion.  · Nausea.  · Difficulty sleeping.  · Difficulty concentrating.  Symptoms may occur suddenly or develop slowly. If your anemia is mild, you may not have symptoms.  How is this diagnosed?  This condition is diagnosed based on:  · Blood tests.  · Your medical history.  · A physical exam.  · Bone marrow biopsy.  Your health care provider may also check your stool  (feces) for blood and may do additional testing to look for the cause of your bleeding.  You may also have other tests, including:  · Imaging tests, such as a CT scan or MRI.  · Endoscopy.  · Colonoscopy.  How is this treated?  Treatment for this condition depends on the cause. If you continue to lose a lot of blood, you may need to be treated at a hospital. Treatment may include:  · Taking supplements of iron, vitamin B12, or folic acid.  · Taking a hormone medicine (erythropoietin) that can help to stimulate red blood cell growth.  · Having a blood transfusion. This may be needed if you lose a lot of blood.  · Making changes to your diet.  · Having surgery to remove your spleen.  Follow these instructions at home:  · Take over-the-counter and prescription medicines only as told by your health care provider.  · Take supplements only as told by your health care provider.  · Follow any diet instructions that you were given.  · Keep all follow-up visits as told by your health care provider. This is important.  Contact a health care provider if:  · You develop new bleeding anywhere in the body.  Get help right away if:  · You are very weak.  · You are short of breath.  · You have pain in your abdomen or chest.  · You are dizzy or feel faint.  · You have trouble concentrating.  · You have bloody or black, tarry stools.  · You vomit repeatedly or you vomit up blood.  Summary  · Anemia is a condition in which you do not have enough red blood cells or enough of a substance in your red blood cells that carries oxygen (hemoglobin).  · Symptoms may occur suddenly or develop slowly.  · If your anemia is mild, you may not have symptoms.  · This condition is diagnosed with blood tests as well as a medical history and physical exam. Other tests may be needed.  · Treatment for this condition depends on the cause of the anemia.  This information is not intended to replace advice given to you by your health care provider. Make sure  you discuss any questions you have with your health care provider.  Document Released: 01/25/2006 Document Revised: 11/30/2018 Document Reviewed: 01/19/2018  Elsevier Patient Education © 2020 [employer]      Umbilical Hernia, Adult    A hernia is a bulge of tissue that pushes through an opening between muscles. An umbilical hernia happens in the abdomen, near the belly button (umbilicus). The hernia may contain tissues from the small intestine, large intestine, or fatty tissue covering the intestines (omentum). Umbilical hernias in adults tend to get worse over time, and they require surgical treatment.  There are several types of umbilical hernias. You may have:  · A hernia located just above or below the umbilicus (indirect hernia). This is the most common type of umbilical hernia in adults.  · A hernia that forms through an opening formed by the umbilicus (direct hernia).  · A hernia that comes and goes (reducible hernia). A reducible hernia may be visible only when you strain, lift something heavy, or cough. This type of hernia can be pushed back into the abdomen (reduced).  · A hernia that traps abdominal tissue inside the hernia (incarcerated hernia). This type of hernia cannot be reduced.  · A hernia that cuts off blood flow to the tissues inside the hernia (strangulated hernia). The tissues can start to die if this happens. This type of hernia requires emergency treatment.  What are the causes?  An umbilical hernia happens when tissue inside the abdomen presses on a weak area of the abdominal muscles.  What increases the risk?  You may have a greater risk of this condition if you:  · Are obese.  · Have had several pregnancies.  · Have a buildup of fluid inside your abdomen (ascites).  · Have had surgery that weakens the abdominal muscles.  What are the signs or symptoms?  The main symptom of this condition is a painless bulge at or near the belly button. A reducible hernia may be visible only when you  strain, lift something heavy, or cough. Other symptoms may include:  · Dull pain.  · A feeling of pressure.  Symptoms of a strangulated hernia may include:  · Pain that gets increasingly worse.  · Nausea and vomiting.  · Pain when pressing on the hernia.  · Skin over the hernia becoming red or purple.  · Constipation.  · Blood in the stool.  How is this diagnosed?  This condition may be diagnosed based on:  · A physical exam. You may be asked to cough or strain while standing. These actions increase the pressure inside your abdomen and force the hernia through the opening in your muscles. Your health care provider may try to reduce the hernia by pressing on it.  · Your symptoms and medical history.  How is this treated?  Surgery is the only treatment for an umbilical hernia. Surgery for a strangulated hernia is done as soon as possible. If you have a small hernia that is not incarcerated, you may need to lose weight before having surgery.  Follow these instructions at home:  · Lose weight, if told by your health care provider.  · Do not try to push the hernia back in.  · Watch your hernia for any changes in color or size. Tell your health care provider if any changes occur.  · You may need to avoid activities that increase pressure on your hernia.  · Do not lift anything that is heavier than 10 lb (4.5 kg) until your health care provider says that this is safe.  · Take over-the-counter and prescription medicines only as told by your health care provider.  · Keep all follow-up visits as told by your health care provider. This is important.  Contact a health care provider if:  · Your hernia gets larger.  · Your hernia becomes painful.  Get help right away if:  · You develop sudden, severe pain near the area of your hernia.  · You have pain as well as nausea or vomiting.  · You have pain and the skin over your hernia changes color.  · You develop a fever.  This information is not intended to replace advice given to you  by your health care provider. Make sure you discuss any questions you have with your health care provider.  Document Released: 05/19/2017 Document Revised: 01/30/2019 Document Reviewed: 06/18/2018  Elsevier Patient Education © 2020 Else[employer] Inc.

## 2021-11-10 NOTE — PROGRESS NOTES
AA&Ox4.    SpO2 94*% on RA. Denies SOB.    Reporting 4/10 pain. Medicated per MAR.    SKIN CDI    Tolerating Diabetic diet. Denies N/V.    + void.    + BM / LBM PTA.    Pt ambulates/up with No assistance.    All needs met at this time. Call light within reach. Pt calls appropriately.

## 2021-11-10 NOTE — PROGRESS NOTES
AA&Ox4.    SpO2 **% on RA/**L O2 via NC/oxymask. Denies SOB.    Reporting 0/10 pain. Medicated per MAR.    SKIN CDI.     Tolerating Diabetic diet. Denies N/V.    + void.    + BM / LBM PTA.    Pt ambulates/up with No assistance required.    All needs met at this time. Call light within reach. Pt calls appropriately.

## 2021-11-10 NOTE — CARE PLAN
The patient is Stable - Low risk of patient condition declining or worsening    Shift Goals  Clinical Goals: diet instruction  Patient Goals: rest    Progress made toward(s) clinical / shift goals:      Problem: Knowledge Deficit - Standard  Goal: Patient and family/care givers will demonstrate understanding of plan of care, disease process/condition, diagnostic tests and medications  Outcome: Progressing   Pt was educated on POC, MAR, shift routine and nursing education R/T Dx. Questions were encouraged and answered. Pt verbalized understanding of all teaching.      Problem: Pain - Standard  Goal: Alleviation of pain or a reduction in pain to the patient’s comfort goal  Outcome: Progressing   Pt was educated on 0-10 pain scale, non-pharm methods of pain relief and MAR. Pt demonstrates understanding by rating pain as a 0 on 0-10 pain scale. Pt states that their pain is well controlled and declines pain medication.       Patient is not progressing towards the following goals:

## 2021-11-11 NOTE — CONSULTS
Diabetes education: Pt is newly dx with diabetes admitted with blood sugar of 350 and Hg a1c of 7.0% ( not sure how accurate as pt' Hg was 5.9/6.8).  Met with pt and son prior to discharge. Discussed what diabetes was, what effects blood sugars, goals for blood sugars, Metformin, need to eat controlled carbs and proteins with ever meal ( per son she was eating only one small meal a day but lunch tray was completed).   CDE called [facility] ( pt has no insurance but followed x 1 with [facility]). Metformin was  $4.00, Prilosec was $30 ( it can be obtained OTC) and iron was OTC.  Explained to son need to speak with pharmacist regarding above so as not to pay $30.  Attempted to make appointment with [facility] but their office was closed.  Recommended son call to make a after hospital appointment. Number for CDE given for questions. Pt has a [facility] DM book in [place], briefly reviewed.'  All education and discussion with pt done in [place].